# Patient Record
Sex: FEMALE | URBAN - METROPOLITAN AREA
[De-identification: names, ages, dates, MRNs, and addresses within clinical notes are randomized per-mention and may not be internally consistent; named-entity substitution may affect disease eponyms.]

---

## 2024-06-09 ENCOUNTER — NURSE TRIAGE (OUTPATIENT)
Dept: NURSING | Facility: CLINIC | Age: 39
End: 2024-06-09

## 2024-06-09 NOTE — TELEPHONE ENCOUNTER
Nurse Triage SBAR    Situation: Vaginal odor    Background: Patient calling. Pt had intercourse for the first time in a while last week and now noticing an odor change.     Assessment: Vaginal odor. White discharge - more than normal. No rash. No itchiness. Worried about STD/STI.     Protocol Recommended Disposition: See Physician within 3 days    Recommendation: According to the protocol, Patient should be seen within 3 days. Advised Patient that the patient needs to be seen within 3 days. Care advice given. Patient verbalizes understanding and agrees with plan of care. Reviewed concerning symptoms and when to call back. Connected with scheduling.     Breanna Allan, RN Nursing Advisor 6/9/2024 7:11 PM     Reason for Disposition   Patient is worried they have a sexually transmitted infection (STI)    Additional Information   Negative: Sounds like a life-threatening emergency to the triager   Negative: Followed a genital area injury (e.g., vagina, vulva)   Negative: Foreign body in vagina (e.g., tampon)   Negative: Vaginal bleeding is main symptom   Negative: Vaginal discharge is main symptom   Negative: Pain or burning with passing urine (urination) is main symptom   Negative: Menstrual cramps is main symptom   Negative: Abdomen pain is main symptom   Negative: Pubic lice suspected   Negative: Itching or rash of female genital area (e.g., labia, vagina, vulva)   Negative: Pregnant and labor suspected   Negative: Patient sounds very sick or weak to the triager   Negative: [1] SEVERE pain AND [2] not improved 2 hours after pain medicine   Negative: [1] Genital area looks infected (e.g., draining sore, spreading redness) AND [2] fever   Negative: [1] Something is hanging out of the vagina AND [2] can't easily be pushed back inside   Negative: MODERATE-SEVERE itching (i.e., interferes with school, work, or sleep)   Negative: [1] MILD-MODERATE pain AND [2] present > 24 hours  (Exception: Chronic pain.)   Negative: Genital  "area looks infected (e.g., draining sore, spreading redness)   Negative: Rash with painful tiny water blisters   Negative: [1] Rash (e.g., redness, tiny bumps, sore) of genital area AND [2] present > 24 hours   Negative: Tender lump (swelling or \"ball\") at vaginal opening   Negative: [1] Symptoms of a yeast infection (i.e., itchy, white discharge, not bad smelling) AND [2] not improved > 3 days following Care Advice   Negative: [1] Vaginal itching AND [2] not improved > 3 days following Care Advice   Negative: [1] Vaginal odor (bad smell) AND [2] not improved > 3 days following Care Advice    Protocols used: Vaginal Symptoms-A-AH    "